# Patient Record
Sex: MALE | Race: BLACK OR AFRICAN AMERICAN | NOT HISPANIC OR LATINO | Employment: FULL TIME | URBAN - METROPOLITAN AREA
[De-identification: names, ages, dates, MRNs, and addresses within clinical notes are randomized per-mention and may not be internally consistent; named-entity substitution may affect disease eponyms.]

---

## 2019-07-29 PROBLEM — R31.9 URINARY TRACT INFECTION WITH HEMATURIA: Status: ACTIVE | Noted: 2019-07-29

## 2019-07-29 PROBLEM — N39.0 URINARY TRACT INFECTION WITH HEMATURIA: Status: ACTIVE | Noted: 2019-07-29

## 2019-07-30 PROBLEM — E87.1 HYPONATREMIA: Status: ACTIVE | Noted: 2019-07-30

## 2019-07-30 PROBLEM — N17.9 AKI (ACUTE KIDNEY INJURY): Status: ACTIVE | Noted: 2019-07-30

## 2019-07-30 PROBLEM — E87.6 HYPOKALEMIA: Status: ACTIVE | Noted: 2019-07-30

## 2019-08-02 PROBLEM — N41.0 ACUTE PROSTATITIS: Status: ACTIVE | Noted: 2019-08-02

## 2020-10-07 ENCOUNTER — OFFICE VISIT (OUTPATIENT)
Dept: ORTHOPEDICS | Facility: CLINIC | Age: 65
End: 2020-10-07
Attending: ORTHOPAEDIC SURGERY
Payer: MEDICARE

## 2020-10-07 VITALS — WEIGHT: 185 LBS | HEIGHT: 69 IN | BODY MASS INDEX: 27.4 KG/M2

## 2020-10-07 DIAGNOSIS — M18.11 PRIMARY OSTEOARTHRITIS OF FIRST CARPOMETACARPAL JOINT OF RIGHT HAND: ICD-10-CM

## 2020-10-07 PROCEDURE — 99213 OFFICE O/P EST LOW 20 MIN: CPT | Mod: PBBFAC | Performed by: ORTHOPAEDIC SURGERY

## 2020-10-07 PROCEDURE — 99999 PR PBB SHADOW E&M-EST. PATIENT-LVL III: CPT | Mod: PBBFAC,,, | Performed by: ORTHOPAEDIC SURGERY

## 2020-10-07 PROCEDURE — 99999 PR PBB SHADOW E&M-EST. PATIENT-LVL III: ICD-10-PCS | Mod: PBBFAC,,, | Performed by: ORTHOPAEDIC SURGERY

## 2020-10-07 PROCEDURE — 20600 DRAIN/INJ JOINT/BURSA W/O US: CPT | Mod: PBBFAC,RT | Performed by: ORTHOPAEDIC SURGERY

## 2020-10-07 PROCEDURE — 20600 PR DRAIN/INJECT SMALL JOINT/BURSA: ICD-10-PCS | Mod: S$PBB,RT,, | Performed by: ORTHOPAEDIC SURGERY

## 2020-10-07 PROCEDURE — 99203 OFFICE O/P NEW LOW 30 MIN: CPT | Mod: S$PBB,25,, | Performed by: ORTHOPAEDIC SURGERY

## 2020-10-07 PROCEDURE — 20600 DRAIN/INJ JOINT/BURSA W/O US: CPT | Mod: S$PBB,RT,, | Performed by: ORTHOPAEDIC SURGERY

## 2020-10-07 PROCEDURE — 99203 PR OFFICE/OUTPT VISIT, NEW, LEVL III, 30-44 MIN: ICD-10-PCS | Mod: S$PBB,25,, | Performed by: ORTHOPAEDIC SURGERY

## 2020-10-07 RX ORDER — OMEPRAZOLE 20 MG/1
CAPSULE, DELAYED RELEASE ORAL
COMMUNITY

## 2020-10-07 RX ORDER — FLUOXETINE HYDROCHLORIDE 20 MG/1
10 CAPSULE ORAL
COMMUNITY
Start: 2020-06-30

## 2020-10-07 RX ORDER — METFORMIN HYDROCHLORIDE 1000 MG/1
TABLET ORAL
COMMUNITY
Start: 2020-07-23

## 2020-10-07 RX ORDER — ATORVASTATIN CALCIUM 80 MG/1
TABLET, FILM COATED ORAL
COMMUNITY

## 2020-10-07 RX ORDER — ERGOCALCIFEROL 1.25 MG/1
CAPSULE ORAL
COMMUNITY
Start: 2019-11-15

## 2020-10-07 RX ORDER — DICLOFENAC SODIUM 10 MG/G
GEL TOPICAL
COMMUNITY
Start: 2020-07-23

## 2020-10-07 RX ORDER — TRIAMCINOLONE ACETONIDE 40 MG/ML
20 INJECTION, SUSPENSION INTRA-ARTICULAR; INTRAMUSCULAR
Status: COMPLETED | OUTPATIENT
Start: 2020-10-07 | End: 2020-10-07

## 2020-10-07 RX ORDER — BUDESONIDE AND FORMOTEROL FUMARATE DIHYDRATE 160; 4.5 UG/1; UG/1
AEROSOL RESPIRATORY (INHALATION)
COMMUNITY
Start: 2019-11-15

## 2020-10-07 RX ADMIN — TRIAMCINOLONE ACETONIDE 20 MG: 40 INJECTION, SUSPENSION INTRA-ARTICULAR; INTRAMUSCULAR at 04:10

## 2020-10-07 NOTE — PROGRESS NOTES
Subjective:      Patient ID: Sharath Joshua is a 64 y.o. male.    Chief Complaint: Joint Pain (Right thumb)      HPI  Sharath Joshua is a  64 y.o. male presenting today for right hand and thumb pain.  There was not a history of trauma.  Onset of symptoms began about 3 or 4 months ago  No history of trauma  No numbness or tingling reported.      Review of patient's allergies indicates:  No Known Allergies      Current Outpatient Medications   Medication Sig Dispense Refill    amlodipine (NORVASC) 10 MG tablet Take 10 mg by mouth once daily.      atorvastatin (LIPITOR) 80 MG tablet atorvastatin 80 mg tablet   Take 1 tablet every day by oral route.      budesonide-formoterol 160-4.5 mcg (SYMBICORT) 160-4.5 mcg/actuation HFAA INHALE 2 PUFFS BY MOUTH TWICE A DAY FOR ASTHMA OR COPD      diclofenac sodium (VOLTAREN) 1 % Gel APPLY 2 GRAMS TOPICALLY QD AS NEEDED FOR PAIN AND INFLAMMATION. USE ENCLOSED DOSING CARD.      ergocalciferol (ERGOCALCIFEROL) 50,000 unit Cap TAKE 1 CAPSULE BY MOUTH Q MONTH AS A VITAMIN SUPPLEMENT      FLUoxetine 20 MG capsule TAKE 1 CAPSULE BY MOUTH EVERY DAY FOR MENTAL HEALTH      levoFLOXacin (LEVAQUIN) 500 MG tablet Take 1 tablet (500 mg total) by mouth once daily. 14 tablet 0    metFORMIN (GLUCOPHAGE) 1000 MG tablet TAKE ONE-HALF TABLET BY MOUTH QD WITH FOOD FOR DIABETES      metroNIDAZOLE (FLAGYL) 500 MG tablet Take 1 tablet (500 mg total) by mouth every 12 (twelve) hours. 16 tablet 0    omeprazole (PRILOSEC) 20 MG capsule omeprazole 20 mg capsule,delayed release   Take 1 capsule every day by oral route.      tamsulosin (FLOMAX) 0.4 mg Cap Take 1 capsule (0.4 mg total) by mouth once daily. 30 capsule 11     Current Facility-Administered Medications   Medication Dose Route Frequency Provider Last Rate Last Dose    [COMPLETED] triamcinolone acetonide injection 20 mg  20 mg INTRABURSAL 1 time in Clinic/HOD Robby Starr Jr., MD   20 mg at 10/07/20 1615       Past Medical History:  "  Diagnosis Date    Enlarged prostate 2006    GERD (gastroesophageal reflux disease)     Hypertension        Past Surgical History:   Procedure Laterality Date    PROSTATE BIOPSY  07/23/2019       Review of Systems:  ROS    OBJECTIVE:     PHYSICAL EXAM:  Height: 5' 9" (175.3 cm) Weight: 83.9 kg (185 lb)  Vitals:    10/07/20 1553   Weight: 83.9 kg (185 lb)   Height: 5' 9" (1.753 m)   PainSc:   4     Well developed, well nourished male in no acute distress  Alert and oriented x 3  HEENT- Normal exam  Lungs- Clear to auscultation  Heart- Regular rate and rhythm  Abdomen- Soft nontender  Extremity exam- examination right hand demonstrates tenderness at the base of the right thumb  Positive grind test at the CMC joint   strength slightly decreased  Tinel sign negative at the wrist  No triggering  Sensation intact all digits    RADIOGRAPHS:  None  Comments: I have personally reviewed the imaging and I agree with the above radiologist's report.    ASSESSMENT/PLAN:     IMPRESSION:  CMC arthritis right thumb    PLAN:  Explained the nature of the problem to the patient  Recommended injection  After pause for time-out identified the right thumb injected CMC joint combination Kenalog 20 mg 0.5 cc xylocaine sterile technique  Tolerated the procedure well without complication  I have also given him a thumb brace for support anti-inflammatory medication by mouth follow-up 4-6 weeks       - We talked at length about the anatomy and pathophysiology of   Encounter Diagnosis   Name Primary?    Primary osteoarthritis of first carpometacarpal joint of right hand            Disclaimer: This note has been generated using voice-recognition software. There may be typographical errors that have been missed during proof-reading.    "

## 2022-01-16 DIAGNOSIS — U07.1 COVID-19: Primary | ICD-10-CM

## 2022-01-17 ENCOUNTER — INFUSION (OUTPATIENT)
Dept: INFECTIOUS DISEASES | Facility: HOSPITAL | Age: 67
End: 2022-01-17
Attending: EMERGENCY MEDICINE
Payer: MEDICARE

## 2022-01-17 VITALS
DIASTOLIC BLOOD PRESSURE: 88 MMHG | OXYGEN SATURATION: 96 % | BODY MASS INDEX: 28.58 KG/M2 | TEMPERATURE: 98 F | RESPIRATION RATE: 20 BRPM | WEIGHT: 193 LBS | SYSTOLIC BLOOD PRESSURE: 152 MMHG | HEIGHT: 69 IN | HEART RATE: 64 BPM

## 2022-01-17 DIAGNOSIS — U07.1 COVID-19: Primary | ICD-10-CM

## 2022-01-17 PROCEDURE — 63600175 PHARM REV CODE 636 W HCPCS: Performed by: INTERNAL MEDICINE

## 2022-01-17 PROCEDURE — M0243 CASIRIVI AND IMDEVI INFUSION: HCPCS | Performed by: INTERNAL MEDICINE

## 2022-01-17 RX ORDER — DIPHENHYDRAMINE HYDROCHLORIDE 50 MG/ML
25 INJECTION INTRAMUSCULAR; INTRAVENOUS ONCE AS NEEDED
Status: ACTIVE | OUTPATIENT
Start: 2022-01-17 | End: 2033-06-15

## 2022-01-17 RX ORDER — SODIUM CHLORIDE 0.9 % (FLUSH) 0.9 %
10 SYRINGE (ML) INJECTION
Status: ACTIVE | OUTPATIENT
Start: 2022-01-17

## 2022-01-17 RX ORDER — EPINEPHRINE 0.3 MG/.3ML
0.3 INJECTION SUBCUTANEOUS
Status: ACTIVE | OUTPATIENT
Start: 2022-01-17

## 2022-01-17 RX ORDER — ACETAMINOPHEN 325 MG/1
650 TABLET ORAL ONCE AS NEEDED
Status: ACTIVE | OUTPATIENT
Start: 2022-01-17 | End: 2033-06-15

## 2022-01-17 RX ORDER — ALBUTEROL SULFATE 90 UG/1
2 AEROSOL, METERED RESPIRATORY (INHALATION)
Status: ACTIVE | OUTPATIENT
Start: 2022-01-17

## 2022-01-17 RX ORDER — ONDANSETRON 4 MG/1
4 TABLET, ORALLY DISINTEGRATING ORAL ONCE AS NEEDED
Status: ACTIVE | OUTPATIENT
Start: 2022-01-17 | End: 2033-06-15

## 2022-01-17 RX ADMIN — CASIRIVIMAB AND IMDEVIMAB 600 MG: 600; 600 INJECTION, SOLUTION, CONCENTRATE INTRAVENOUS at 01:01

## 2022-01-17 NOTE — PROGRESS NOTES
Patient arrives for casirivimab/ imdevimab infusion. Ambulatory. Pt AAox3. No distress noted. RR even and unlabored.     Symptoms and onset date:  Muscle aches, SOB, coughing, wheezing     Tested COVID + on 01/16/22

## 2023-01-03 ENCOUNTER — OFFICE VISIT (OUTPATIENT)
Dept: CARDIOLOGY | Facility: CLINIC | Age: 68
End: 2023-01-03
Payer: MEDICARE

## 2023-01-03 VITALS
OXYGEN SATURATION: 97 % | HEIGHT: 69 IN | HEART RATE: 70 BPM | SYSTOLIC BLOOD PRESSURE: 132 MMHG | DIASTOLIC BLOOD PRESSURE: 75 MMHG | WEIGHT: 198.19 LBS | BODY MASS INDEX: 29.36 KG/M2

## 2023-01-03 DIAGNOSIS — R06.09 DOE (DYSPNEA ON EXERTION): ICD-10-CM

## 2023-01-03 DIAGNOSIS — Z87.891 FORMER SMOKER: ICD-10-CM

## 2023-01-03 DIAGNOSIS — J44.9 CHRONIC OBSTRUCTIVE PULMONARY DISEASE, UNSPECIFIED COPD TYPE: ICD-10-CM

## 2023-01-03 DIAGNOSIS — I71.9 AORTIC ANEURYSM, UNSPECIFIED PORTION OF AORTA, UNSPECIFIED WHETHER RUPTURED: Primary | ICD-10-CM

## 2023-01-03 DIAGNOSIS — R73.03 PRE-DIABETES: ICD-10-CM

## 2023-01-03 DIAGNOSIS — I10 HTN (HYPERTENSION), BENIGN: ICD-10-CM

## 2023-01-03 DIAGNOSIS — K21.9 GASTROESOPHAGEAL REFLUX DISEASE, UNSPECIFIED WHETHER ESOPHAGITIS PRESENT: ICD-10-CM

## 2023-01-03 DIAGNOSIS — E78.5 HYPERLIPIDEMIA, UNSPECIFIED HYPERLIPIDEMIA TYPE: ICD-10-CM

## 2023-01-03 DIAGNOSIS — I27.20 PULMONARY HYPERTENSION: ICD-10-CM

## 2023-01-03 DIAGNOSIS — C61 PROSTATE CANCER: ICD-10-CM

## 2023-01-03 PROCEDURE — 3008F BODY MASS INDEX DOCD: CPT | Mod: CPTII,GC,S$GLB, | Performed by: STUDENT IN AN ORGANIZED HEALTH CARE EDUCATION/TRAINING PROGRAM

## 2023-01-03 PROCEDURE — 99999 PR PBB SHADOW E&M-EST. PATIENT-LVL V: CPT | Mod: PBBFAC,HCNC,GC, | Performed by: STUDENT IN AN ORGANIZED HEALTH CARE EDUCATION/TRAINING PROGRAM

## 2023-01-03 PROCEDURE — 3288F PR FALLS RISK ASSESSMENT DOCUMENTED: ICD-10-PCS | Mod: CPTII,GC,S$GLB, | Performed by: STUDENT IN AN ORGANIZED HEALTH CARE EDUCATION/TRAINING PROGRAM

## 2023-01-03 PROCEDURE — 3078F PR MOST RECENT DIASTOLIC BLOOD PRESSURE < 80 MM HG: ICD-10-PCS | Mod: CPTII,GC,S$GLB, | Performed by: STUDENT IN AN ORGANIZED HEALTH CARE EDUCATION/TRAINING PROGRAM

## 2023-01-03 PROCEDURE — 3075F SYST BP GE 130 - 139MM HG: CPT | Mod: CPTII,GC,S$GLB, | Performed by: STUDENT IN AN ORGANIZED HEALTH CARE EDUCATION/TRAINING PROGRAM

## 2023-01-03 PROCEDURE — 1126F PR PAIN SEVERITY QUANTIFIED, NO PAIN PRESENT: ICD-10-PCS | Mod: CPTII,GC,S$GLB, | Performed by: STUDENT IN AN ORGANIZED HEALTH CARE EDUCATION/TRAINING PROGRAM

## 2023-01-03 PROCEDURE — 3288F FALL RISK ASSESSMENT DOCD: CPT | Mod: CPTII,GC,S$GLB, | Performed by: STUDENT IN AN ORGANIZED HEALTH CARE EDUCATION/TRAINING PROGRAM

## 2023-01-03 PROCEDURE — 3078F DIAST BP <80 MM HG: CPT | Mod: CPTII,GC,S$GLB, | Performed by: STUDENT IN AN ORGANIZED HEALTH CARE EDUCATION/TRAINING PROGRAM

## 2023-01-03 PROCEDURE — 1101F PT FALLS ASSESS-DOCD LE1/YR: CPT | Mod: CPTII,GC,S$GLB, | Performed by: STUDENT IN AN ORGANIZED HEALTH CARE EDUCATION/TRAINING PROGRAM

## 2023-01-03 PROCEDURE — 1159F PR MEDICATION LIST DOCUMENTED IN MEDICAL RECORD: ICD-10-PCS | Mod: CPTII,GC,S$GLB, | Performed by: STUDENT IN AN ORGANIZED HEALTH CARE EDUCATION/TRAINING PROGRAM

## 2023-01-03 PROCEDURE — 99204 OFFICE O/P NEW MOD 45 MIN: CPT | Mod: GC,S$GLB,, | Performed by: STUDENT IN AN ORGANIZED HEALTH CARE EDUCATION/TRAINING PROGRAM

## 2023-01-03 PROCEDURE — 99999 PR PBB SHADOW E&M-EST. PATIENT-LVL V: ICD-10-PCS | Mod: PBBFAC,HCNC,GC, | Performed by: STUDENT IN AN ORGANIZED HEALTH CARE EDUCATION/TRAINING PROGRAM

## 2023-01-03 PROCEDURE — 99204 PR OFFICE/OUTPT VISIT, NEW, LEVL IV, 45-59 MIN: ICD-10-PCS | Mod: GC,S$GLB,, | Performed by: STUDENT IN AN ORGANIZED HEALTH CARE EDUCATION/TRAINING PROGRAM

## 2023-01-03 PROCEDURE — 1101F PR PT FALLS ASSESS DOC 0-1 FALLS W/OUT INJ PAST YR: ICD-10-PCS | Mod: CPTII,GC,S$GLB, | Performed by: STUDENT IN AN ORGANIZED HEALTH CARE EDUCATION/TRAINING PROGRAM

## 2023-01-03 PROCEDURE — 3008F PR BODY MASS INDEX (BMI) DOCUMENTED: ICD-10-PCS | Mod: CPTII,GC,S$GLB, | Performed by: STUDENT IN AN ORGANIZED HEALTH CARE EDUCATION/TRAINING PROGRAM

## 2023-01-03 PROCEDURE — 1126F AMNT PAIN NOTED NONE PRSNT: CPT | Mod: CPTII,GC,S$GLB, | Performed by: STUDENT IN AN ORGANIZED HEALTH CARE EDUCATION/TRAINING PROGRAM

## 2023-01-03 PROCEDURE — 3075F PR MOST RECENT SYSTOLIC BLOOD PRESS GE 130-139MM HG: ICD-10-PCS | Mod: CPTII,GC,S$GLB, | Performed by: STUDENT IN AN ORGANIZED HEALTH CARE EDUCATION/TRAINING PROGRAM

## 2023-01-03 PROCEDURE — 1159F MED LIST DOCD IN RCRD: CPT | Mod: CPTII,GC,S$GLB, | Performed by: STUDENT IN AN ORGANIZED HEALTH CARE EDUCATION/TRAINING PROGRAM

## 2023-01-03 NOTE — ASSESSMENT & PLAN NOTE
-pending TTE  -unclear if related to COPD or HFpEF vs CAD. Euvolemic on exam  -CTa/TTE, will adjust plan accordingly

## 2023-01-03 NOTE — PROGRESS NOTES
" Patient ID:  Sharath Joshua is a 67 y.o. y.o. male who presents for evaluation Establish Care, Follow-up, and Referral Authorization      Sharath Joshua is a 68 yo M with HTN, HLD, former smoker, COPD, pre-diabetes and recently discovered aortic aneurysm and PH [facility] who presents for evaluation of aortic aneurysm and PH. He does not have records available, but was under the impression the CT disc had been sent to our office. He has never been told he had an aneurysm prior to recent low dose CT scan to screen for lung cancer, and denies any family history of aneurysms/BAV. He is a former smoker, and quit many years ago. He denies prior history of CAD and has never had an angiogram. However, he does say that he has RICO that has been ongoing for some time. Has never had a workup for this. Denies orthopnea or le edema and has no angina. States bp has been well controlled at home. No other acute events.     Review of Systems   All other systems reviewed and are negative.     Objective:     /75 (BP Location: Left arm, Patient Position: Sitting, BP Method: Large (Automatic))   Pulse 70   Ht 5' 9" (1.753 m)   Wt 89.9 kg (198 lb 3.1 oz)   SpO2 97%   BMI 29.27 kg/m²     Physical Exam  Vitals and nursing note reviewed.   Constitutional:       Appearance: Normal appearance.   HENT:      Head: Normocephalic and atraumatic.      Right Ear: External ear normal.      Left Ear: External ear normal.      Nose: Nose normal.      Mouth/Throat:      Mouth: Mucous membranes are moist.   Eyes:      Pupils: Pupils are equal, round, and reactive to light.   Cardiovascular:      Rate and Rhythm: Normal rate and regular rhythm.      Pulses: Normal pulses.      Heart sounds: Murmur heard.      Comments: 2/6 vibratory murmur to L mid clavicular line at the 4th intercostal space   Pulmonary:      Effort: Pulmonary effort is normal.   Abdominal:      General: Abdomen is flat.   Musculoskeletal:         General: Normal range of " motion.      Cervical back: Normal range of motion.   Skin:     General: Skin is warm.      Capillary Refill: Capillary refill takes less than 2 seconds.   Neurological:      General: No focal deficit present.      Mental Status: He is alert and oriented to person, place, and time. Mental status is at baseline.     Labs:     Lab Results   Component Value Date     08/03/2019    K 3.8 08/03/2019     08/03/2019    CO2 27 08/03/2019    BUN 14 08/03/2019    CREATININE 1.1 08/03/2019    ANIONGAP 10 08/03/2019     No results found for: HGBA1C  No results found for: BNP, BNPTRIAGEBLO    Lab Results   Component Value Date    WBC 9.70 08/03/2019    HGB 11.5 (L) 08/03/2019    HCT 35.1 (L) 08/03/2019     08/03/2019    GRAN 10.7 (H) 07/30/2019    GRAN 85.0 (H) 07/30/2019     No results found for: CHOL, HDL, LDLCALC, TRIG    Meds:     Current Outpatient Medications:     amlodipine (NORVASC) 10 MG tablet, Take 10 mg by mouth once daily., Disp: , Rfl:     atorvastatin (LIPITOR) 80 MG tablet, atorvastatin 80 mg tablet  Take 1 tablet every day by oral route., Disp: , Rfl:     budesonide-formoterol 160-4.5 mcg (SYMBICORT) 160-4.5 mcg/actuation HFAA, INHALE 2 PUFFS BY MOUTH TWICE A DAY FOR ASTHMA OR COPD, Disp: , Rfl:     diclofenac sodium (VOLTAREN) 1 % Gel, APPLY 2 GRAMS TOPICALLY QD AS NEEDED FOR PAIN AND INFLAMMATION. USE ENCLOSED DOSING CARD., Disp: , Rfl:     ergocalciferol (ERGOCALCIFEROL) 50,000 unit Cap, TAKE 1 CAPSULE BY MOUTH Q MONTH AS A VITAMIN SUPPLEMENT, Disp: , Rfl:     FLUoxetine 20 MG capsule, TAKE 1 CAPSULE BY MOUTH EVERY DAY FOR MENTAL HEALTH, Disp: , Rfl:     levoFLOXacin (LEVAQUIN) 500 MG tablet, Take 1 tablet (500 mg total) by mouth once daily., Disp: 14 tablet, Rfl: 0    metFORMIN (GLUCOPHAGE) 1000 MG tablet, TAKE ONE-HALF TABLET BY MOUTH QD WITH FOOD FOR DIABETES, Disp: , Rfl:     metroNIDAZOLE (FLAGYL) 500 MG tablet, Take 1 tablet (500 mg total) by mouth every 12 (twelve) hours.,  Disp: 16 tablet, Rfl: 0    omeprazole (PRILOSEC) 20 MG capsule, omeprazole 20 mg capsule,delayed release  Take 1 capsule every day by oral route., Disp: , Rfl:     tamsulosin (FLOMAX) 0.4 mg Cap, Take 1 capsule (0.4 mg total) by mouth once daily., Disp: 30 capsule, Rfl: 11    Current Facility-Administered Medications:     acetaminophen tablet 650 mg, 650 mg, Oral, Once PRN, Gerardo Caro MD    albuterol inhaler 2 puff, 2 puff, Inhalation, Q20 Min PRN, Gerardo Caro MD    diphenhydrAMINE injection 25 mg, 25 mg, Intravenous, Once PRN, Gerardo Caro MD    EPINEPHrine (EPIPEN) 0.3 mg/0.3 mL pen injection 0.3 mg, 0.3 mg, Intramuscular, PRN, Gerardo Caro MD    methylPREDNISolone sodium succinate injection 40 mg, 40 mg, Intravenous, Once PRN, Gerardo Caro MD    ondansetron disintegrating tablet 4 mg, 4 mg, Oral, Once PRN, Gerardo Caro MD    sodium chloride 0.9% 500 mL flush bag, , Intravenous, PRN, Gerardo Caro MD    sodium chloride 0.9% flush 10 mL, 10 mL, Intravenous, PRN, Gerardo Caro MD      Assessment & Plan:     Prostate cancer  -stable per patient     Pulmonary hypertension  -[facility] report, but no records available, pending TTE    Aortic aneurysm  -per [facility] report of [facility], but no records available  -CTa    COPD (chronic obstructive pulmonary disease)  -unclear history  -pending TTE to evaluate for PH    Hyperlipidemia  -atorvastatin     Hypertension  -well controlled on amlodipine per patient     RICO (dyspnea on exertion)  -pending TTE  -unclear if related to COPD or HFpEF vs CAD. Euvolemic on exam  -CTa/TTE, will adjust plan accordingly

## 2023-05-05 ENCOUNTER — HOSPITAL ENCOUNTER (OUTPATIENT)
Dept: CARDIOLOGY | Facility: HOSPITAL | Age: 68
Discharge: HOME | End: 2023-05-05
Attending: STUDENT IN AN ORGANIZED HEALTH CARE EDUCATION/TRAINING PROGRAM
Payer: MEDICARE

## 2023-05-05 ENCOUNTER — HOSPITAL ENCOUNTER (OUTPATIENT)
Dept: RADIOLOGY | Facility: HOSPITAL | Age: 68
Discharge: HOME | End: 2023-05-05
Attending: STUDENT IN AN ORGANIZED HEALTH CARE EDUCATION/TRAINING PROGRAM
Payer: MEDICARE

## 2023-05-05 VITALS
WEIGHT: 198 LBS | SYSTOLIC BLOOD PRESSURE: 140 MMHG | HEART RATE: 62 BPM | DIASTOLIC BLOOD PRESSURE: 80 MMHG | HEIGHT: 69 IN | BODY MASS INDEX: 29.33 KG/M2

## 2023-05-05 DIAGNOSIS — I27.20 PULMONARY HYPERTENSION: ICD-10-CM

## 2023-05-05 DIAGNOSIS — I71.9 AORTIC ANEURYSM, UNSPECIFIED PORTION OF AORTA, UNSPECIFIED WHETHER RUPTURED: ICD-10-CM

## 2023-05-05 LAB
ASCENDING AORTA: 4.5 CM
AV INDEX (PROSTH): 0.59
AV MEAN GRADIENT: 8 MMHG
AV PEAK GRADIENT: 13 MMHG
AV REGURGITATION PRESSURE HALF TIME: 575 MS
AV VALVE AREA: 2.44 CM2
AV VELOCITY RATIO: 0.6
BSA FOR ECHO PROCEDURE: 2.09 M2
CV ECHO LV RWT: 0.26 CM
DOP CALC AO PEAK VEL: 1.83 M/S
DOP CALC AO VTI: 43.13 CM
DOP CALC LVOT AREA: 4.2 CM2
DOP CALC LVOT DIAMETER: 2.3 CM
DOP CALC LVOT PEAK VEL: 1.1 M/S
DOP CALC LVOT STROKE VOLUME: 105.23 CM3
DOP CALC MV VTI: 10.7 CM
DOP CALCLVOT PEAK VEL VTI: 25.34 CM
E WAVE DECELERATION TIME: 168.18 MSEC
E/A RATIO: 1.02
E/E' RATIO: 6.67 M/S
ECHO LV POSTERIOR WALL: 0.83 CM (ref 0.6–1.1)
EJECTION FRACTION: 55 %
FRACTIONAL SHORTENING: 35 % (ref 28–44)
INTERVENTRICULAR SEPTUM: 0.94 CM (ref 0.6–1.1)
IVRT: 119.89 MSEC
LA MAJOR: 6.4 CM
LA MINOR: 6.37 CM
LA WIDTH: 4.81 CM
LEFT ATRIUM SIZE: 4.6 CM
LEFT ATRIUM VOLUME INDEX MOD: 40.6 ML/M2
LEFT ATRIUM VOLUME INDEX: 58.3 ML/M2
LEFT ATRIUM VOLUME MOD: 83.64 CM3
LEFT ATRIUM VOLUME: 120.08 CM3
LEFT INTERNAL DIMENSION IN SYSTOLE: 4.09 CM (ref 2.1–4)
LEFT VENTRICLE DIASTOLIC VOLUME INDEX: 96.5 ML/M2
LEFT VENTRICLE DIASTOLIC VOLUME: 198.79 ML
LEFT VENTRICLE MASS INDEX: 111 G/M2
LEFT VENTRICLE SYSTOLIC VOLUME INDEX: 35.8 ML/M2
LEFT VENTRICLE SYSTOLIC VOLUME: 73.78 ML
LEFT VENTRICULAR INTERNAL DIMENSION IN DIASTOLE: 6.27 CM (ref 3.5–6)
LEFT VENTRICULAR MASS: 227.92 G
LV LATERAL E/E' RATIO: 5.56 M/S
LV SEPTAL E/E' RATIO: 8.33 M/S
MV A" WAVE DURATION": 8.85 MSEC
MV MEAN GRADIENT: 0 MMHG
MV PEAK A VEL: 0.49 M/S
MV PEAK E VEL: 0.5 M/S
MV PEAK GRADIENT: 1 MMHG
MV STENOSIS PRESSURE HALF TIME: 48.77 MS
MV VALVE AREA BY CONTINUITY EQUATION: 9.83 CM2
MV VALVE AREA P 1/2 METHOD: 4.51 CM2
PISA MRMAX VEL: 0.05 M/S
PISA TR MAX VEL: 2.87 M/S
PULM VEIN S/D RATIO: 0.97
PV PEAK D VEL: 0.33 M/S
PV PEAK S VEL: 0.32 M/S
QEF: 58 %
RA MAJOR: 6.35 CM
RA PRESSURE: 3 MMHG
RA WIDTH: 5.67 CM
RIGHT VENTRICULAR END-DIASTOLIC DIMENSION: 3.83 CM
SINUS: 3.39 CM
STJ: 3.25 CM
TDI LATERAL: 0.09 M/S
TDI SEPTAL: 0.06 M/S
TDI: 0.08 M/S
TR MAX PG: 33 MMHG
TRICUSPID ANNULAR PLANE SYSTOLIC EXCURSION: 2.84 CM
TV REST PULMONARY ARTERY PRESSURE: 36 MMHG

## 2023-05-05 PROCEDURE — 25500020 PHARM REV CODE 255: Performed by: STUDENT IN AN ORGANIZED HEALTH CARE EDUCATION/TRAINING PROGRAM

## 2023-05-05 PROCEDURE — 93356 ECHO (CUPID ONLY): ICD-10-PCS | Mod: ,,, | Performed by: INTERNAL MEDICINE

## 2023-05-05 PROCEDURE — 93306 ECHO (CUPID ONLY): ICD-10-PCS | Mod: 26,,, | Performed by: INTERNAL MEDICINE

## 2023-05-05 PROCEDURE — 71275 CTA CHEST AORTA NON CORONARY: ICD-10-PCS | Mod: 26,,, | Performed by: RADIOLOGY

## 2023-05-05 PROCEDURE — 93306 TTE W/DOPPLER COMPLETE: CPT | Mod: 26,,, | Performed by: INTERNAL MEDICINE

## 2023-05-05 PROCEDURE — 71275 CT ANGIOGRAPHY CHEST: CPT | Mod: 26,,, | Performed by: RADIOLOGY

## 2023-05-05 PROCEDURE — 93356 MYOCRD STRAIN IMG SPCKL TRCK: CPT

## 2023-05-05 PROCEDURE — 93356 MYOCRD STRAIN IMG SPCKL TRCK: CPT | Mod: ,,, | Performed by: INTERNAL MEDICINE

## 2023-05-05 PROCEDURE — 71275 CT ANGIOGRAPHY CHEST: CPT | Mod: TC

## 2023-05-05 RX ADMIN — IOHEXOL 100 ML: 350 INJECTION, SOLUTION INTRAVENOUS at 01:05

## 2023-05-09 ENCOUNTER — OFFICE VISIT (OUTPATIENT)
Dept: CARDIOLOGY | Facility: CLINIC | Age: 68
End: 2023-05-09
Payer: MEDICARE

## 2023-05-09 VITALS
HEIGHT: 69 IN | HEART RATE: 57 BPM | OXYGEN SATURATION: 98 % | WEIGHT: 197.75 LBS | BODY MASS INDEX: 29.29 KG/M2 | DIASTOLIC BLOOD PRESSURE: 64 MMHG | SYSTOLIC BLOOD PRESSURE: 105 MMHG

## 2023-05-09 DIAGNOSIS — I71.9 AORTIC ANEURYSM, UNSPECIFIED PORTION OF AORTA, UNSPECIFIED WHETHER RUPTURED: ICD-10-CM

## 2023-05-09 DIAGNOSIS — I51.89 DIASTOLIC DYSFUNCTION: ICD-10-CM

## 2023-05-09 DIAGNOSIS — I50.32 CHRONIC HEART FAILURE WITH PRESERVED EJECTION FRACTION: ICD-10-CM

## 2023-05-09 DIAGNOSIS — I27.20 PULMONARY HYPERTENSION: ICD-10-CM

## 2023-05-09 DIAGNOSIS — E78.5 HYPERLIPIDEMIA, UNSPECIFIED HYPERLIPIDEMIA TYPE: ICD-10-CM

## 2023-05-09 DIAGNOSIS — R06.09 DOE (DYSPNEA ON EXERTION): ICD-10-CM

## 2023-05-09 DIAGNOSIS — I10 HTN (HYPERTENSION), BENIGN: Primary | ICD-10-CM

## 2023-05-09 DIAGNOSIS — I70.0 AORTIC ATHEROSCLEROSIS: ICD-10-CM

## 2023-05-09 DIAGNOSIS — J44.9 CHRONIC OBSTRUCTIVE PULMONARY DISEASE, UNSPECIFIED COPD TYPE: ICD-10-CM

## 2023-05-09 PROBLEM — I50.30 (HFPEF) HEART FAILURE WITH PRESERVED EJECTION FRACTION: Chronic | Status: ACTIVE | Noted: 2023-05-09

## 2023-05-09 PROCEDURE — 3074F PR MOST RECENT SYSTOLIC BLOOD PRESSURE < 130 MM HG: ICD-10-PCS | Mod: CPTII,GC,S$GLB, | Performed by: STUDENT IN AN ORGANIZED HEALTH CARE EDUCATION/TRAINING PROGRAM

## 2023-05-09 PROCEDURE — 99999 PR PBB SHADOW E&M-EST. PATIENT-LVL V: CPT | Mod: PBBFAC,GC,, | Performed by: STUDENT IN AN ORGANIZED HEALTH CARE EDUCATION/TRAINING PROGRAM

## 2023-05-09 PROCEDURE — 1126F PR PAIN SEVERITY QUANTIFIED, NO PAIN PRESENT: ICD-10-PCS | Mod: CPTII,GC,S$GLB, | Performed by: STUDENT IN AN ORGANIZED HEALTH CARE EDUCATION/TRAINING PROGRAM

## 2023-05-09 PROCEDURE — 3078F DIAST BP <80 MM HG: CPT | Mod: CPTII,GC,S$GLB, | Performed by: STUDENT IN AN ORGANIZED HEALTH CARE EDUCATION/TRAINING PROGRAM

## 2023-05-09 PROCEDURE — 3078F PR MOST RECENT DIASTOLIC BLOOD PRESSURE < 80 MM HG: ICD-10-PCS | Mod: CPTII,GC,S$GLB, | Performed by: STUDENT IN AN ORGANIZED HEALTH CARE EDUCATION/TRAINING PROGRAM

## 2023-05-09 PROCEDURE — 1101F PR PT FALLS ASSESS DOC 0-1 FALLS W/OUT INJ PAST YR: ICD-10-PCS | Mod: CPTII,GC,S$GLB, | Performed by: STUDENT IN AN ORGANIZED HEALTH CARE EDUCATION/TRAINING PROGRAM

## 2023-05-09 PROCEDURE — 1101F PT FALLS ASSESS-DOCD LE1/YR: CPT | Mod: CPTII,GC,S$GLB, | Performed by: STUDENT IN AN ORGANIZED HEALTH CARE EDUCATION/TRAINING PROGRAM

## 2023-05-09 PROCEDURE — 99214 OFFICE O/P EST MOD 30 MIN: CPT | Mod: GC,S$GLB,, | Performed by: STUDENT IN AN ORGANIZED HEALTH CARE EDUCATION/TRAINING PROGRAM

## 2023-05-09 PROCEDURE — 1126F AMNT PAIN NOTED NONE PRSNT: CPT | Mod: CPTII,GC,S$GLB, | Performed by: STUDENT IN AN ORGANIZED HEALTH CARE EDUCATION/TRAINING PROGRAM

## 2023-05-09 PROCEDURE — 3288F PR FALLS RISK ASSESSMENT DOCUMENTED: ICD-10-PCS | Mod: CPTII,GC,S$GLB, | Performed by: STUDENT IN AN ORGANIZED HEALTH CARE EDUCATION/TRAINING PROGRAM

## 2023-05-09 PROCEDURE — 3288F FALL RISK ASSESSMENT DOCD: CPT | Mod: CPTII,GC,S$GLB, | Performed by: STUDENT IN AN ORGANIZED HEALTH CARE EDUCATION/TRAINING PROGRAM

## 2023-05-09 PROCEDURE — 3008F BODY MASS INDEX DOCD: CPT | Mod: CPTII,GC,S$GLB, | Performed by: STUDENT IN AN ORGANIZED HEALTH CARE EDUCATION/TRAINING PROGRAM

## 2023-05-09 PROCEDURE — 3008F PR BODY MASS INDEX (BMI) DOCUMENTED: ICD-10-PCS | Mod: CPTII,GC,S$GLB, | Performed by: STUDENT IN AN ORGANIZED HEALTH CARE EDUCATION/TRAINING PROGRAM

## 2023-05-09 PROCEDURE — 99999 PR PBB SHADOW E&M-EST. PATIENT-LVL V: ICD-10-PCS | Mod: PBBFAC,GC,, | Performed by: STUDENT IN AN ORGANIZED HEALTH CARE EDUCATION/TRAINING PROGRAM

## 2023-05-09 PROCEDURE — 1159F PR MEDICATION LIST DOCUMENTED IN MEDICAL RECORD: ICD-10-PCS | Mod: CPTII,GC,S$GLB, | Performed by: STUDENT IN AN ORGANIZED HEALTH CARE EDUCATION/TRAINING PROGRAM

## 2023-05-09 PROCEDURE — 99214 PR OFFICE/OUTPT VISIT, EST, LEVL IV, 30-39 MIN: ICD-10-PCS | Mod: GC,S$GLB,, | Performed by: STUDENT IN AN ORGANIZED HEALTH CARE EDUCATION/TRAINING PROGRAM

## 2023-05-09 PROCEDURE — 1159F MED LIST DOCD IN RCRD: CPT | Mod: CPTII,GC,S$GLB, | Performed by: STUDENT IN AN ORGANIZED HEALTH CARE EDUCATION/TRAINING PROGRAM

## 2023-05-09 PROCEDURE — 3074F SYST BP LT 130 MM HG: CPT | Mod: CPTII,GC,S$GLB, | Performed by: STUDENT IN AN ORGANIZED HEALTH CARE EDUCATION/TRAINING PROGRAM

## 2023-05-09 NOTE — ASSESSMENT & PLAN NOTE
-likely combination of copd/diastolic heart failure   -start jardiance and continue copd treatment

## 2023-05-09 NOTE — PROGRESS NOTES
"Referring provider: No ref. provider found     Patient ID:  Sharath Joshua is a 67 y.o. y.o. male who presents for follow-up Establish Care, Follow-up, and Shortness of Breath      01/03/23:   Sharath Joshua is a 68 yo M with HTN, HLD, former smoker, COPD, pre-diabetes and recently discovered aortic aneurysm and PH [facility] who presents for evaluation of aortic aneurysm and PH. He does not have records available, but was under the impression the CT disc had been sent to our office. He has never been told he had an aneurysm prior to recent low dose CT scan to screen for lung cancer, and denies any family history of aneurysms/BAV. He is a former smoker, and quit many years ago. He denies prior history of CAD and has never had an angiogram. However, he does say that he has RICO that has been ongoing for some time. Has never had a workup for this. Denies orthopnea or le edema and has no angina. States bp has been well controlled at home. No other acute events.     05/09/23 (today):  Since our last visit, he has been doing well. He does complain of RICO with moderate exertion, but denies orthopnea, pnd or le edema. BP is well controlled on amlodipine. Had CT/TTE with mild aneurysm of ascending aortic aneurysm. No other acute complaints.       Review of Systems   All other systems reviewed and are negative.     Objective:     /64 (BP Location: Left arm, Patient Position: Sitting, BP Method: Large (Automatic))   Pulse (!) 57   Ht 5' 9" (1.753 m)   Wt 89.7 kg (197 lb 12 oz)   SpO2 98%   BMI 29.20 kg/m²     Physical Exam  Vitals and nursing note reviewed.   Constitutional:       Appearance: Normal appearance.   HENT:      Head: Normocephalic and atraumatic.      Right Ear: External ear normal.      Left Ear: External ear normal.      Nose: Nose normal.      Mouth/Throat:      Mouth: Mucous membranes are moist.   Eyes:      Extraocular Movements: Extraocular movements intact.      Pupils: Pupils are equal, " round, and reactive to light.   Cardiovascular:      Rate and Rhythm: Normal rate and regular rhythm.      Pulses: Normal pulses.   Pulmonary:      Effort: Pulmonary effort is normal.   Abdominal:      General: Abdomen is flat.   Musculoskeletal:         General: Normal range of motion.      Cervical back: Normal range of motion.      Right lower leg: No edema.      Left lower leg: No edema.   Skin:     General: Skin is warm and dry.      Capillary Refill: Capillary refill takes less than 2 seconds.   Neurological:      General: No focal deficit present.      Mental Status: He is alert and oriented to person, place, and time. Mental status is at baseline.     Labs:     Lab Results   Component Value Date     08/03/2019    K 3.8 08/03/2019     08/03/2019    CO2 27 08/03/2019    BUN 14 08/03/2019    CREATININE 1.1 08/03/2019    ANIONGAP 10 08/03/2019     No results found for: HGBA1C  No results found for: BNP, BNPTRIAGEBLO    Lab Results   Component Value Date    WBC 9.70 08/03/2019    HGB 11.5 (L) 08/03/2019    HCT 35.1 (L) 08/03/2019     08/03/2019    GRAN 10.7 (H) 07/30/2019    GRAN 85.0 (H) 07/30/2019     No results found for: CHOL, HDL, LDLCALC, TRIG    Meds:     Current Outpatient Medications:     amlodipine (NORVASC) 10 MG tablet, Take 10 mg by mouth once daily., Disp: , Rfl:     atorvastatin (LIPITOR) 80 MG tablet, atorvastatin 80 mg tablet  Take 1 tablet every day by oral route., Disp: , Rfl:     budesonide-formoterol 160-4.5 mcg (SYMBICORT) 160-4.5 mcg/actuation HFAA, INHALE 2 PUFFS BY MOUTH TWICE A DAY FOR ASTHMA OR COPD, Disp: , Rfl:     diclofenac sodium (VOLTAREN) 1 % Gel, APPLY 2 GRAMS TOPICALLY QD AS NEEDED FOR PAIN AND INFLAMMATION. USE ENCLOSED DOSING CARD., Disp: , Rfl:     ergocalciferol (ERGOCALCIFEROL) 50,000 unit Cap, TAKE 1 CAPSULE BY MOUTH Q MONTH AS A VITAMIN SUPPLEMENT, Disp: , Rfl:     FLUoxetine 20 MG capsule, 10 mg., Disp: , Rfl:     levoFLOXacin (LEVAQUIN) 500 MG  tablet, Take 1 tablet (500 mg total) by mouth once daily., Disp: 14 tablet, Rfl: 0    metFORMIN (GLUCOPHAGE) 1000 MG tablet, TAKE ONE-HALF TABLET BY MOUTH QD WITH FOOD FOR DIABETES, Disp: , Rfl:     metroNIDAZOLE (FLAGYL) 500 MG tablet, Take 1 tablet (500 mg total) by mouth every 12 (twelve) hours., Disp: 16 tablet, Rfl: 0    omeprazole (PRILOSEC) 20 MG capsule, omeprazole 20 mg capsule,delayed release  Take 1 capsule every day by oral route., Disp: , Rfl:     empagliflozin (JARDIANCE) 10 mg tablet, Take 1 tablet (10 mg total) by mouth once daily., Disp: 90 tablet, Rfl: 3    tamsulosin (FLOMAX) 0.4 mg Cap, Take 1 capsule (0.4 mg total) by mouth once daily., Disp: 30 capsule, Rfl: 11    Current Facility-Administered Medications:     acetaminophen tablet 650 mg, 650 mg, Oral, Once PRN, Gerardo Caro MD    albuterol inhaler 2 puff, 2 puff, Inhalation, Q20 Min PRN, Gerardo Caro MD    diphenhydrAMINE injection 25 mg, 25 mg, Intravenous, Once PRN, Gerardo Caro MD    EPINEPHrine (EPIPEN) 0.3 mg/0.3 mL pen injection 0.3 mg, 0.3 mg, Intramuscular, PRN, Gerardo Caro MD    methylPREDNISolone sodium succinate injection 40 mg, 40 mg, Intravenous, Once PRN, Gerardo Caro MD    ondansetron disintegrating tablet 4 mg, 4 mg, Oral, Once PRN, Gerardo Caro MD    sodium chloride 0.9% 500 mL flush bag, , Intravenous, PRN, Gerardo Caro MD    sodium chloride 0.9% flush 10 mL, 10 mL, Intravenous, PRN, Gerardo Caro MD      Assessment & Plan:     Aortic atherosclerosis  -via CT scan   -bp and lipid control     COPD (chronic obstructive pulmonary disease)  -symbicort, fu with pcp/pulm    Pulmonary hypertension  -likely group 2 +/- group 3  -appears euvolemic on exam today     Hypertension  -well controlled on amlodipine 10    Hyperlipidemia  -atorvastatin     RICO (dyspnea on exertion)  -likely combination of copd/diastolic heart failure   -start jardiance and continue copd treatment      (HFpEF) heart failure with preserved ejection fraction  -NYHA I/II  -continue bp control with amlodipine  -start jardiance and check bmp in 1 week  -euvolemic on exam    Aortic aneurysm  -tricuspid AV and aneurysm measuring 4.1cm via CT and 4.5cm via TTE, no prior images to compare to   -continue bp/hr control  -repeat TTE in 1 year with comparison to prior exam

## 2023-05-09 NOTE — ASSESSMENT & PLAN NOTE
-tricuspid AV and aneurysm measuring 4.1cm via CT and 4.5cm via TTE, no prior images to compare to   -continue bp/hr control  -repeat TTE in 1 year with comparison to prior exam

## 2023-05-09 NOTE — ASSESSMENT & PLAN NOTE
-NYHA I/II  -continue bp control with amlodipine  -start jardiance and check bmp in 1 week  -euvolemic on exam

## 2024-01-19 ENCOUNTER — OFFICE VISIT (OUTPATIENT)
Dept: CARDIOLOGY | Facility: CLINIC | Age: 69
End: 2024-01-19
Payer: MEDICARE

## 2024-01-19 VITALS
SYSTOLIC BLOOD PRESSURE: 129 MMHG | OXYGEN SATURATION: 97 % | WEIGHT: 193.56 LBS | BODY MASS INDEX: 28.67 KG/M2 | HEIGHT: 69 IN | DIASTOLIC BLOOD PRESSURE: 71 MMHG | HEART RATE: 62 BPM

## 2024-01-19 DIAGNOSIS — I50.32 CHRONIC HEART FAILURE WITH PRESERVED EJECTION FRACTION: Chronic | ICD-10-CM

## 2024-01-19 DIAGNOSIS — E78.2 MIXED HYPERLIPIDEMIA: ICD-10-CM

## 2024-01-19 DIAGNOSIS — I27.20 PULMONARY HYPERTENSION: ICD-10-CM

## 2024-01-19 DIAGNOSIS — I71.9 AORTIC ANEURYSM WITHOUT RUPTURE, UNSPECIFIED PORTION OF AORTA: Primary | ICD-10-CM

## 2024-01-19 DIAGNOSIS — I10 HTN (HYPERTENSION), BENIGN: ICD-10-CM

## 2024-01-19 DIAGNOSIS — I70.0 AORTIC ATHEROSCLEROSIS: ICD-10-CM

## 2024-01-19 PROCEDURE — 1126F AMNT PAIN NOTED NONE PRSNT: CPT | Mod: CPTII,S$GLB,, | Performed by: PHYSICIAN ASSISTANT

## 2024-01-19 PROCEDURE — 3074F SYST BP LT 130 MM HG: CPT | Mod: CPTII,S$GLB,, | Performed by: PHYSICIAN ASSISTANT

## 2024-01-19 PROCEDURE — 99214 OFFICE O/P EST MOD 30 MIN: CPT | Mod: S$GLB,,, | Performed by: PHYSICIAN ASSISTANT

## 2024-01-19 PROCEDURE — 3078F DIAST BP <80 MM HG: CPT | Mod: CPTII,S$GLB,, | Performed by: PHYSICIAN ASSISTANT

## 2024-01-19 PROCEDURE — 99999 PR PBB SHADOW E&M-EST. PATIENT-LVL IV: CPT | Mod: PBBFAC,,, | Performed by: PHYSICIAN ASSISTANT

## 2024-01-19 PROCEDURE — 3008F BODY MASS INDEX DOCD: CPT | Mod: CPTII,S$GLB,, | Performed by: PHYSICIAN ASSISTANT

## 2024-01-19 PROCEDURE — 4010F ACE/ARB THERAPY RXD/TAKEN: CPT | Mod: CPTII,S$GLB,, | Performed by: PHYSICIAN ASSISTANT

## 2024-01-19 RX ORDER — SACUBITRIL AND VALSARTAN 24; 26 MG/1; MG/1
1 TABLET, FILM COATED ORAL 2 TIMES DAILY
Qty: 180 TABLET | Refills: 3 | Status: SHIPPED | OUTPATIENT
Start: 2024-01-19 | End: 2025-01-18

## 2024-01-19 RX ORDER — SACUBITRIL AND VALSARTAN 24; 26 MG/1; MG/1
1 TABLET, FILM COATED ORAL 2 TIMES DAILY
Qty: 180 TABLET | Refills: 3 | OUTPATIENT
Start: 2024-01-19 | End: 2024-01-19 | Stop reason: SDUPTHER

## 2024-01-19 RX ORDER — SACUBITRIL AND VALSARTAN 24; 26 MG/1; MG/1
1 TABLET, FILM COATED ORAL 2 TIMES DAILY
Qty: 180 TABLET | Refills: 3 | Status: SHIPPED | OUTPATIENT
Start: 2024-01-19 | End: 2024-01-19 | Stop reason: SDUPTHER

## 2024-01-19 NOTE — PROGRESS NOTES
General Cardiology Clinic Note  Reason for Visit: Discuss test results  Last Clinic Visit: 5/10/23 with Dr. Ramsey     HPI:     Sharath Joshua is a 68 y.o. M, who presents for second opinion regarding TAA and AS    PROBLEM LIST:  HFpEF  HTN  HLD  COPD  Aortic aneurysm  Aortic atherosclerosis  Pulm HTN    Interval HPI:   Today [facility] presents to [facility] who discussed options for treating aneurysm although the dilation recently was measured at only 4.5 cm. Apparently this is per an echo in early Jan, but unfortunately report is not contained in the records that he brought for review today. Apparently his EF is also recently reduced to 45%, but again, report is unavailable. He has noted mild shortness of breath with exertion over the past 3 months. He has no chest pain, pressure or discomfort.     Dr. Ramsey (fellow) HPI (5/10/23)  Since our last visit, he has been doing well. He does complain of RICO with moderate exertion, but denies orthopnea, pnd or le edema. BP is well controlled on amlodipine. Had CT/TTE with mild aneurysm of ascending aortic aneurysm. No other acute complaints.     Dr. Ramsey (fellow) HPI (1/3/23)  Sharath Joshua is a 68 yo M with HTN, HLD, former smoker, COPD, pre-diabetes and recently discovered aortic aneurysm and PH [facility] who presents for evaluation of aortic aneurysm and PH. He does not have records available, but was under the impression the CT disc had been sent to our office. He has never been told he had an aneurysm prior to recent low dose CT scan to screen for lung cancer, and denies any family history of aneurysms/BAV. He is a former smoker, and quit many years ago. He denies prior history of CAD and has never had an angiogram. However, he does say that he has RICO that has been ongoing for some time. Has never had a workup for this. Denies orthopnea or le edema and has no angina. States bp has been well controlled at home.  No other acute events.       Surgical: Reviewed, as below.  Family: Reviewed, as below. No premature CAD, HF, SCD.  Social: Reviewed, as below.    ROS:    Pertinent ROS included in HPI and below.  PMH:     Past Medical History:   Diagnosis Date    Enlarged prostate 2006    GERD (gastroesophageal reflux disease)     Hypertension      Past Surgical History:   Procedure Laterality Date    PROSTATE BIOPSY  07/23/2019     Allergies:   Review of patient's allergies indicates:  No Known Allergies  Medications:     Current Outpatient Medications on File Prior to Visit   Medication Sig Dispense Refill    atorvastatin (LIPITOR) 80 MG tablet atorvastatin 80 mg tablet   Take 1 tablet every day by oral route.      budesonide-formoterol 160-4.5 mcg (SYMBICORT) 160-4.5 mcg/actuation HFAA INHALE 2 PUFFS BY MOUTH TWICE A DAY FOR ASTHMA OR COPD      empagliflozin (JARDIANCE) 10 mg tablet Take 1 tablet (10 mg total) by mouth once daily. 90 tablet 3    FLUoxetine 20 MG capsule 10 mg.      metFORMIN (GLUCOPHAGE) 1000 MG tablet TAKE ONE-HALF TABLET BY MOUTH QD WITH FOOD FOR DIABETES      omeprazole (PRILOSEC) 20 MG capsule omeprazole 20 mg capsule,delayed release   Take 1 capsule every day by oral route.      [DISCONTINUED] amlodipine (NORVASC) 10 MG tablet Take 10 mg by mouth once daily.      diclofenac sodium (VOLTAREN) 1 % Gel APPLY 2 GRAMS TOPICALLY QD AS NEEDED FOR PAIN AND INFLAMMATION. USE ENCLOSED DOSING CARD.      ergocalciferol (ERGOCALCIFEROL) 50,000 unit Cap TAKE 1 CAPSULE BY MOUTH Q MONTH AS A VITAMIN SUPPLEMENT      levoFLOXacin (LEVAQUIN) 500 MG tablet Take 1 tablet (500 mg total) by mouth once daily. (Patient not taking: Reported on 1/19/2024) 14 tablet 0    metroNIDAZOLE (FLAGYL) 500 MG tablet Take 1 tablet (500 mg total) by mouth every 12 (twelve) hours. (Patient not taking: Reported on 1/19/2024) 16 tablet 0    tamsulosin (FLOMAX) 0.4 mg Cap Take 1 capsule (0.4 mg total) by mouth once daily. 30 capsule 11     Current  "Facility-Administered Medications on File Prior to Visit   Medication Dose Route Frequency Provider Last Rate Last Admin    acetaminophen tablet 650 mg  650 mg Oral Once PRN Gerardo Caro MD        albuterol inhaler 2 puff  2 puff Inhalation Q20 Min PRN Gerardo Caro MD        diphenhydrAMINE injection 25 mg  25 mg Intravenous Once PRN Gerardo Caro MD        EPINEPHrine (EPIPEN) 0.3 mg/0.3 mL pen injection 0.3 mg  0.3 mg Intramuscular PRN Gerardo Caro MD        methylPREDNISolone sodium succinate injection 40 mg  40 mg Intravenous Once PRN Gerardo Caro MD        ondansetron disintegrating tablet 4 mg  4 mg Oral Once PRN Gerardo Caro MD        sodium chloride 0.9% 500 mL flush bag   Intravenous PRN Gerardo Caro MD        sodium chloride 0.9% flush 10 mL  10 mL Intravenous PRN Gerardo Caro MD         Social History:     Social History     Tobacco Use    Smoking status: Former    Smokeless tobacco: Not on file   Substance Use Topics    Alcohol use: No     Family History:     Family History   Problem Relation Age of Onset    Kidney disease Mother     Hypertension Mother     COPD Father      Physical Exam:   /71   Pulse 62   Ht 5' 9" (1.753 m)   Wt 87.8 kg (193 lb 9 oz)   SpO2 97%   BMI 28.58 kg/m²      Physical Exam  Vitals and nursing note reviewed.   Constitutional:       Appearance: Normal appearance.   HENT:      Head: Normocephalic and atraumatic.   Neck:      Vascular: Normal carotid pulses. No carotid bruit or hepatojugular reflux.   Cardiovascular:      Rate and Rhythm: Normal rate and regular rhythm.      Chest Wall: PMI is not displaced.      Pulses:           Radial pulses are 2+ on the right side and 2+ on the left side.        Dorsalis pedis pulses are 2+ on the right side and 2+ on the left side.        Posterior tibial pulses are 2+ on the right side and 2+ on the left side.      Heart sounds: Murmur heard.      Harsh midsystolic murmur is " "present with a grade of 1/6 at the upper right sternal border radiating to the neck.   Pulmonary:      Effort: Pulmonary effort is normal.      Breath sounds: Normal breath sounds. No wheezing, rhonchi or rales.   Abdominal:      General: Bowel sounds are normal. There is no abdominal bruit.      Palpations: Abdomen is soft. There is no pulsatile mass.      Tenderness: There is no abdominal tenderness.   Feet:      Right foot:      Skin integrity: Skin integrity normal.      Left foot:      Skin integrity: Skin integrity normal.   Skin:     Capillary Refill: Capillary refill takes less than 2 seconds.   Neurological:      General: No focal deficit present.      Mental Status: He is alert.   Psychiatric:         Mood and Affect: Mood and affect normal.         Speech: Speech normal.         Behavior: Behavior is cooperative.         Thought Content: Thought content normal.          Labs:     Blood Tests:  Lab Results   Component Value Date     05/16/2023    K 3.8 05/16/2023     05/16/2023    CO2 24 05/16/2023    BUN 11 05/16/2023    CREATININE 1.2 05/16/2023    GLU 71 05/16/2023    MG 1.8 07/30/2019    AST 29 07/29/2019    ALT 17 07/29/2019    ALBUMIN 3.6 07/29/2019    PROT 8.0 07/29/2019    BILITOT 0.9 07/29/2019    WBC 9.70 08/03/2019    HGB 11.5 (L) 08/03/2019    HCT 35.1 (L) 08/03/2019    MCV 84 08/03/2019     08/03/2019       No results found for: "CHOL", "HDL", "LDL", "TRIG"    No results found for: "LDLCALC"    No results found for: "TSH"    No results found for: "HGBA1C"      Imaging:     Echocardiogram  TTE 5/5/23  The left ventricle is mildly enlarged with normal systolic function. The estimated ejection fraction is 55-60%.  Normal right ventricular size with normal right ventricular systolic function.  Grade II left ventricular diastolic dysfunction.  Severe biatrial enlargement.  The ascending aorta is moderately dilated - 45mm in diameter.  Mild aortic regurgitation.  Mild tricuspid " regurgitation.  Mild mitral regurgitation.  The estimated PA systolic pressure is 36 mmHg.  Normal central venous pressure (3 mmHg).    Stress testing  None    Cath Lab  None    Other  CTA Chest/Aorta 5/5/23:  Impression:  1. Aneurysmal dilatation of the ascending aorta up to 4.1 cm at the mid segment.  2. Moderate hiatal hernia, mildly dilated and gas distended esophagus.    US Abdomen/Aorta 3/24/23:  Impression:  1.  Aneurysmal dilatation of proximal abdominal aorta measuring 3.5 x 3.0 cm.  Follow-up dedicated CTA for evaluation of the entirety of the aorta, as clinically warranted.  2.  Ectasia of the iliac arteries.    Assessment:     1. Aortic aneurysm without rupture, unspecified portion of aorta    2. Aortic atherosclerosis    3. Mixed hyperlipidemia    4. Chronic heart failure with preserved ejection fraction    5. Hypertension    6. Pulmonary hypertension        Plan:     Aortic aneurysm without rupture, unspecified portion of aorta  -Tricuspid AV and aneurysm  - 4.8 cm on screening lung CT 12/2022  - 4.1cm (mid-ascending) via CTA 5/2023  - 4.5cm via TTE 5/2023  -Discussed the importance of maintaining BP control  -Avoid lifting > 50 lbs  -Avoid flouroquinolones     Aortic atherosclerosis  -Found via CT scan. Continue BP and lipid control regimen.    Mixed hyperlipidemia  -Continue atorvastatin 80mg daily.    Chronic heart failure with preserved ejection fraction  -Appears euvolemic on exam today.  -Continue jardiance.   - stop amlodipine 10 mg and begin Entresto 24-26 mg BID  - BMP in one month     Hypertension  See above     Pulmonary hypertension  Monitor     After reviewing records and discussing with Dr. Starr we have explained to the patient that there is no obvious strong indication for him to undergo surgery at this time.  Alternatively we have recommended repeating CTA chest in May of 2024.  We will also [facility].  If that report is not  obtained within the next 2 weeks we will proceed with updating his echocardiogram at [facility].  I have recommended that he discontinue amlodipine and begin Entresto.  We will check BNP 1 month later.  He should follow-up in the clinic after his CTA chest is updated, or if new or worsening symptoms develop.       Signed:  Jessica Stevens, PA-C [facility]     1/19/2024 11:59 AM    Follow-up:     Future Appointments   Date Time Provider Department Center   2/19/2024  9:35 AM LAB, SBP SBP LAB [facility]

## 2024-02-07 DIAGNOSIS — I71.21 ANEURYSM OF ASCENDING AORTA WITHOUT RUPTURE: Primary | ICD-10-CM

## 2024-03-01 ENCOUNTER — PATIENT MESSAGE (OUTPATIENT)
Dept: CARDIOLOGY | Facility: CLINIC | Age: 69
End: 2024-03-01
Payer: MEDICARE

## 2024-03-18 ENCOUNTER — HOSPITAL ENCOUNTER (OUTPATIENT)
Dept: CARDIOLOGY | Facility: HOSPITAL | Age: 69
Discharge: HOME | End: 2024-03-18
Attending: PHYSICIAN ASSISTANT
Payer: MEDICARE

## 2024-03-18 VITALS
DIASTOLIC BLOOD PRESSURE: 70 MMHG | HEART RATE: 63 BPM | HEIGHT: 69 IN | WEIGHT: 197 LBS | BODY MASS INDEX: 29.18 KG/M2 | SYSTOLIC BLOOD PRESSURE: 130 MMHG

## 2024-03-18 DIAGNOSIS — I71.21 ANEURYSM OF ASCENDING AORTA WITHOUT RUPTURE: ICD-10-CM

## 2024-03-18 LAB
ASCENDING AORTA: 4.4 CM
AV INDEX (PROSTH): 0.43
AV MEAN GRADIENT: 8 MMHG
AV PEAK GRADIENT: 14 MMHG
AV VALVE AREA BY VELOCITY RATIO: 1.83 CM²
AV VALVE AREA: 1.69 CM²
AV VELOCITY RATIO: 0.46
BSA FOR ECHO PROCEDURE: 2.09 M2
CV ECHO LV RWT: 0.32 CM
DOP CALC AO PEAK VEL: 1.85 M/S
DOP CALC AO VTI: 36.84 CM
DOP CALC LVOT AREA: 3.9 CM2
DOP CALC LVOT DIAMETER: 2.24 CM
DOP CALC LVOT PEAK VEL: 0.86 M/S
DOP CALC LVOT STROKE VOLUME: 62.08 CM3
DOP CALCLVOT PEAK VEL VTI: 15.76 CM
E WAVE DECELERATION TIME: 214.35 MSEC
E/A RATIO: 1.4
E/E' RATIO: 10.73 M/S
ECHO LV POSTERIOR WALL: 0.97 CM (ref 0.6–1.1)
FRACTIONAL SHORTENING: 33 % (ref 28–44)
INTERVENTRICULAR SEPTUM: 0.92 CM (ref 0.6–1.1)
LA MAJOR: 6.12 CM
LA MINOR: 5.82 CM
LA WIDTH: 4.13 CM
LEFT ATRIUM SIZE: 3.5 CM
LEFT ATRIUM VOLUME INDEX MOD: 35.3 ML/M2
LEFT ATRIUM VOLUME INDEX: 35.8 ML/M2
LEFT ATRIUM VOLUME MOD: 72.34 CM3
LEFT ATRIUM VOLUME: 73.31 CM3
LEFT INTERNAL DIMENSION IN SYSTOLE: 4.01 CM (ref 2.1–4)
LEFT VENTRICLE DIASTOLIC VOLUME INDEX: 80.67 ML/M2
LEFT VENTRICLE DIASTOLIC VOLUME: 165.38 ML
LEFT VENTRICLE MASS INDEX: 112 G/M2
LEFT VENTRICLE SYSTOLIC VOLUME INDEX: 34.3 ML/M2
LEFT VENTRICLE SYSTOLIC VOLUME: 70.22 ML
LEFT VENTRICULAR INTERNAL DIMENSION IN DIASTOLE: 6 CM (ref 3.5–6)
LEFT VENTRICULAR MASS: 229.54 G
LV LATERAL E/E' RATIO: 11.8 M/S
LV SEPTAL E/E' RATIO: 9.83 M/S
MV PEAK A VEL: 0.42 M/S
MV PEAK E VEL: 0.59 M/S
MV STENOSIS PRESSURE HALF TIME: 62.16 MS
MV VALVE AREA P 1/2 METHOD: 3.54 CM2
PISA TR MAX VEL: 2.48 M/S
RA MAJOR: 5.61 CM
RA PRESSURE ESTIMATED: 3 MMHG
RA WIDTH: 4.76 CM
RIGHT ATRIAL AREA: 23 CM2
RIGHT VENTRICULAR END-DIASTOLIC DIMENSION: 3.92 CM
RV TB RVSP: 5 MMHG
SINUS: 4.2 CM
STJ: 4.13 CM
TDI LATERAL: 0.05 M/S
TDI SEPTAL: 0.06 M/S
TDI: 0.06 M/S
TR MAX PG: 25 MMHG
TRICUSPID ANNULAR PLANE SYSTOLIC EXCURSION: 2.02 CM
TV REST PULMONARY ARTERY PRESSURE: 28 MMHG
Z-SCORE OF LEFT VENTRICULAR DIMENSION IN END DIASTOLE: -0.25
Z-SCORE OF LEFT VENTRICULAR DIMENSION IN END SYSTOLE: 0.49

## 2024-03-18 PROCEDURE — 93306 TTE W/DOPPLER COMPLETE: CPT | Mod: 26,,, | Performed by: INTERNAL MEDICINE

## 2024-03-18 PROCEDURE — 93306 TTE W/DOPPLER COMPLETE: CPT

## 2024-04-04 DIAGNOSIS — I71.9 AORTIC ANEURYSM WITHOUT RUPTURE, UNSPECIFIED PORTION OF AORTA: Primary | ICD-10-CM
